# Patient Record
Sex: MALE | Race: WHITE | Employment: FULL TIME | URBAN - METROPOLITAN AREA
[De-identification: names, ages, dates, MRNs, and addresses within clinical notes are randomized per-mention and may not be internally consistent; named-entity substitution may affect disease eponyms.]

---

## 2020-03-31 ENCOUNTER — HOSPITAL ENCOUNTER (OUTPATIENT)
Age: 25
Discharge: HOME | End: 2020-04-02
Payer: COMMERCIAL

## 2020-03-31 ENCOUNTER — OFFICE VISIT (OUTPATIENT)
Dept: PRIMARY CARE CLINIC | Age: 25
End: 2020-03-31
Payer: COMMERCIAL

## 2020-03-31 VITALS
WEIGHT: 185 LBS | RESPIRATION RATE: 16 BRPM | DIASTOLIC BLOOD PRESSURE: 95 MMHG | HEART RATE: 74 BPM | OXYGEN SATURATION: 98 % | TEMPERATURE: 98.3 F | SYSTOLIC BLOOD PRESSURE: 130 MMHG | HEIGHT: 72 IN | BODY MASS INDEX: 25.06 KG/M2

## 2020-03-31 PROCEDURE — 99213 OFFICE O/P EST LOW 20 MIN: CPT | Performed by: NURSE PRACTITIONER

## 2020-03-31 PROCEDURE — U0002 COVID-19 LAB TEST NON-CDC: HCPCS

## 2020-03-31 NOTE — PROGRESS NOTES
Татьяна Langston  1995    Chief Complaint   Patient presents with    Referral - General     arrested someone Thursday night who has signs of virus pt needs work release      Respiratory Symptoms:  Patient complains of 0 symptoms and has not had any signs or symptoms. He was sent by his beatriz based upon his exposure to [facility] during an arrest. He has been self quarantined since Saturday. Relevant PMH: No pertinent PMH. Smoking history:  He  has no history on file for tobacco.     He has had ill contacts with URI symptoms. Treatment to date: none. Travel screen completed:  Yes       Vitals:    03/31/20 0912 03/31/20 0915   BP: (!) 128/95 (!) 130/95   Site: Left Upper Arm    Position: Sitting    Cuff Size: Medium Adult    Pulse: 74    Resp: 16    Temp: 98.3 °F (36.8 °C)    SpO2: 98%    Weight: 185 lb (83.9 kg)    Height: 6' (1.829 m)       SOB: no  Fever: denies fever or subjective chills  Cough: no  Nasal congestion/rhinorrhea: yes, allergies  Sinus pressure: no  Myalgias: no  Sore throat: no  Recent known exposure to COVID-19:  no  Nausea & Vomiting: no   Diarrhea & bloody stools: no    Physical Exam  Constitutional:       General: He is not in acute distress. Appearance: Normal appearance. He is normal weight. He is not ill-appearing, toxic-appearing or diaphoretic. HENT:      Head: Normocephalic and atraumatic. Right Ear: Tympanic membrane, ear canal and external ear normal. There is no impacted cerumen. Left Ear: Tympanic membrane, ear canal and external ear normal. There is no impacted cerumen. Nose: Rhinorrhea present. No congestion. Mouth/Throat:      Mouth: Mucous membranes are moist.      Pharynx: Oropharynx is clear. No oropharyngeal exudate or posterior oropharyngeal erythema. Eyes:      Extraocular Movements: Extraocular movements intact. Conjunctiva/sclera: Conjunctivae normal.      Pupils: Pupils are equal, round, and reactive to light. Neck:      Musculoskeletal: Normal range of motion and neck supple. No neck rigidity. Cardiovascular:      Rate and Rhythm: Normal rate and regular rhythm. Pulses: Normal pulses. Heart sounds: Normal heart sounds. No murmur. Pulmonary:      Effort: Pulmonary effort is normal. No respiratory distress. Breath sounds: No wheezing. Abdominal:      General: Abdomen is flat. Bowel sounds are normal. There is no distension. Tenderness: There is no abdominal tenderness. Musculoskeletal: Normal range of motion. General: No swelling or deformity. Lymphadenopathy:      Cervical: No cervical adenopathy. Skin:     General: Skin is warm. Capillary Refill: Capillary refill takes less than 2 seconds. Neurological:      General: No focal deficit present. Mental Status: He is alert and oriented to person, place, and time. Mental status is at baseline. Cranial Nerves: No cranial nerve deficit. Motor: No weakness. Coordination: Coordination normal.      Gait: Gait normal.   Psychiatric:         Mood and Affect: Mood normal.         Behavior: Behavior normal.         Thought Content: Thought content normal.       Assessment/Plan:  1. Exposure to 2019 novel coronavirus    - COVID-19; Future    Buffy Hughes will continue to self quarantine for 10 days further, or awaiting negative results from today's COVID testing in our clinic. He is aware that he must remain symptom free for 72 hours without medication regimen prior to returning to work. FAHAD Garcia CNP  3/31/20     This visit was provided as a focused evaluation during the COVID -19 pandemic/national emergency. A comprehensive review of all previous patient history and testing was not conducted. Pertinent findings were elicited during the visit.

## 2020-03-31 NOTE — PATIENT INSTRUCTIONS
secondary transmission to others is thought to be low. The decision to discontinue home isolation precautions should be made on a case-by-case basis, in consultation with healthcare providers and state and local health departments.

## 2020-04-03 ENCOUNTER — TELEPHONE (OUTPATIENT)
Dept: PRIMARY CARE CLINIC | Age: 25
End: 2020-04-03

## 2020-04-03 LAB
REPORT: NORMAL
SARS-COV-2: NOT DETECTED
THIS TEST SENT TO: NORMAL

## 2020-04-03 NOTE — TELEPHONE ENCOUNTER
Spoke with Chaparro Vásquez, advised of results. He reports that he is feeling fine & remains symptom free.

## 2020-04-06 ENCOUNTER — TELEPHONE (OUTPATIENT)
Dept: PRIMARY CARE CLINIC | Age: 25
End: 2020-04-06

## 2020-04-06 NOTE — TELEPHONE ENCOUNTER
[facility]    Attempted to call the patient regarding his flu clinic visit on 3/31/2020. Unable to leave a message at this time due to his voicemail being full.